# Patient Record
Sex: FEMALE | Race: WHITE
[De-identification: names, ages, dates, MRNs, and addresses within clinical notes are randomized per-mention and may not be internally consistent; named-entity substitution may affect disease eponyms.]

---

## 2024-07-11 ENCOUNTER — DASHBOARD ENCOUNTERS (OUTPATIENT)
Age: 69
End: 2024-07-11

## 2024-07-11 ENCOUNTER — OFFICE VISIT (OUTPATIENT)
Dept: URBAN - METROPOLITAN AREA CLINIC 118 | Facility: CLINIC | Age: 69
End: 2024-07-11
Payer: MEDICARE

## 2024-07-11 VITALS
DIASTOLIC BLOOD PRESSURE: 84 MMHG | TEMPERATURE: 98.1 F | BODY MASS INDEX: 32.7 KG/M2 | HEIGHT: 59 IN | HEART RATE: 86 BPM | SYSTOLIC BLOOD PRESSURE: 144 MMHG | WEIGHT: 162.2 LBS

## 2024-07-11 DIAGNOSIS — R07.89 COSTOCHONDRAL CHEST PAIN: ICD-10-CM

## 2024-07-11 DIAGNOSIS — R19.8 FULLNESS OF ABDOMEN: ICD-10-CM

## 2024-07-11 PROBLEM — 161973001: Status: ACTIVE | Noted: 2024-07-11

## 2024-07-11 PROBLEM — 609624008: Status: ACTIVE | Noted: 2024-07-11

## 2024-07-11 PROCEDURE — 99244 OFF/OP CNSLTJ NEW/EST MOD 40: CPT | Performed by: INTERNAL MEDICINE

## 2024-07-11 PROCEDURE — 99204 OFFICE O/P NEW MOD 45 MIN: CPT | Performed by: INTERNAL MEDICINE

## 2024-07-11 RX ORDER — ALPRAZOLAM 0.5 MG/1
TABLET ORAL
Qty: 30 TABLET | Status: ACTIVE | COMMUNITY

## 2024-07-11 NOTE — PHYSICAL EXAM CONSTITUTIONAL:
well developed, well nourished, in no acute distress, ambulating with cane, normal communication ability

## 2024-07-11 NOTE — HPI-TODAY'S VISIT:
69 [place] female here for evaluation of a 2 month hx of intermittent RUQ stabbing pain, constant, worse with eating.  No N/V associated with this, but nausea intermittent. She also complains of upper abd fullness and bloating. BMs regular, 1-2x/d.  No change, no GI bleed.  Has gained 40# over the last 2 years.